# Patient Record
Sex: MALE | Race: BLACK OR AFRICAN AMERICAN | NOT HISPANIC OR LATINO | URBAN - METROPOLITAN AREA
[De-identification: names, ages, dates, MRNs, and addresses within clinical notes are randomized per-mention and may not be internally consistent; named-entity substitution may affect disease eponyms.]

---

## 2021-10-08 ENCOUNTER — EMERGENCY (EMERGENCY)
Facility: HOSPITAL | Age: 31
LOS: 1 days | Discharge: HOME | End: 2021-10-08
Attending: EMERGENCY MEDICINE | Admitting: EMERGENCY MEDICINE
Payer: COMMERCIAL

## 2021-10-08 VITALS
DIASTOLIC BLOOD PRESSURE: 72 MMHG | OXYGEN SATURATION: 100 % | SYSTOLIC BLOOD PRESSURE: 129 MMHG | HEART RATE: 96 BPM | RESPIRATION RATE: 18 BRPM | TEMPERATURE: 97 F

## 2021-10-08 VITALS
SYSTOLIC BLOOD PRESSURE: 112 MMHG | DIASTOLIC BLOOD PRESSURE: 65 MMHG | TEMPERATURE: 98 F | HEART RATE: 93 BPM | OXYGEN SATURATION: 100 % | RESPIRATION RATE: 18 BRPM

## 2021-10-08 DIAGNOSIS — R53.83 OTHER FATIGUE: ICD-10-CM

## 2021-10-08 DIAGNOSIS — J45.909 UNSPECIFIED ASTHMA, UNCOMPLICATED: ICD-10-CM

## 2021-10-08 DIAGNOSIS — R51.9 HEADACHE, UNSPECIFIED: ICD-10-CM

## 2021-10-08 DIAGNOSIS — J02.9 ACUTE PHARYNGITIS, UNSPECIFIED: ICD-10-CM

## 2021-10-08 DIAGNOSIS — M79.10 MYALGIA, UNSPECIFIED SITE: ICD-10-CM

## 2021-10-08 DIAGNOSIS — Z88.6 ALLERGY STATUS TO ANALGESIC AGENT: ICD-10-CM

## 2021-10-08 LAB
RAPID RVP RESULT: SIGNIFICANT CHANGE UP
SARS-COV-2 RNA SPEC QL NAA+PROBE: SIGNIFICANT CHANGE UP

## 2021-10-08 PROCEDURE — 99283 EMERGENCY DEPT VISIT LOW MDM: CPT

## 2021-10-08 PROCEDURE — 0225U NFCT DS DNA&RNA 21 SARSCOV2: CPT

## 2021-10-08 PROCEDURE — 99284 EMERGENCY DEPT VISIT MOD MDM: CPT

## 2021-10-08 RX ORDER — ACETAMINOPHEN 500 MG
650 TABLET ORAL ONCE
Refills: 0 | Status: COMPLETED | OUTPATIENT
Start: 2021-10-08 | End: 2021-10-08

## 2021-10-08 RX ADMIN — Medication 650 MILLIGRAM(S): at 16:34

## 2021-10-08 NOTE — ED PROVIDER NOTE - ATTENDING CONTRIBUTION TO CARE
Pt w/ PMHx asthma, not vaccinated against COVID19 p/w HA, sore throat, myalgias, and fatigue x 10 days. Pt went to an UC, had + rapid strep, placed on abx, w/ improved sore throat, but still w/ dull diffuse HA's, feeling tired, temporarily improved w/ Tylenol. No f/c. No CP, SOB.  No neck stiffness, n/v. Pt w/ PMHx asthma, not vaccinated against COVID19 p/w HA, sore throat, myalgias, and fatigue x 10 days. Pt went to an UC, had + rapid strep, placed on abx, w/ improved sore throat, but still w/ dull diffuse HA's, feeling tired, temporarily improved w/ Tylenol. No f/c. No CP, SOB.  No neck stiffness, n/v. No neuro complaints.   VSS, non toxic appearing, in NAD  RVP performed, f/u results. Supportive care, f/u PCP

## 2021-10-08 NOTE — ED PROVIDER NOTE - PHYSICAL EXAMINATION
Vitals reviewed  Gen: well appearing, nad, speaking in full sentences  Skin: wwp, no rash/lesions  HEENT: ncat, eomi, mmm, no tonsillar edema/exudate, uvula midline,   Neck: supple, no LAD, no meningeal signs  CV: rrr, no audible m/r/g  Resp: symmetrical expansion, ctab, no w/r/r  Abd: nondistended, soft/nt  Ext: FROM throughout, no peripheral edema  Neuro: alert/oriented, no focal deficits, steady gait

## 2021-10-08 NOTE — ED PROVIDER NOTE - NSFOLLOWUPINSTRUCTIONS_ED_ALL_ED_FT
It is likely that you have covid19 or another viral syndrome, which means you need to isolate yourself at home for 10 days after onset of symptoms if covid is positive.  Your results will not come back for approximately 12 hours.    Complete full course of antibiotics for strep throat as prescribed.      PLEASE REST AND REMAIN HYDRATED (EG, GATORADE, PEDIALYTE, ETC). MOTRIN or TYLENOL AS NEEDED FOR FEVER (>100.4F/>38C)  PLEASE FOLLOW-UP WITH YOUR PCP IN 1-2 DAYS    PLEASE RETURN TO THE EMERGENCY DEPARTMENT IF SOMNOLENCE, CHEST PAIN, SHORTNESS OF BREATH, ABDOMINAL PAIN, VOMITING, OTHER CONCERNING SYMPTOMS

## 2021-10-08 NOTE — ED PROVIDER NOTE - PATIENT PORTAL LINK FT
You can access the FollowMyHealth Patient Portal offered by [facility] by registering at the following website: [email or web address]. By joining [employer]’s FollowMyHealth portal, you will also be able to view your health information using other applications (apps) compatible with our system.

## 2021-10-08 NOTE — ED PROVIDER NOTE - CLINICAL SUMMARY MEDICAL DECISION MAKING FREE TEXT BOX
30 M pmh asthma, NOT vaccinated for covid p/w headache, sore throat, bodyaches and fatigue x 10 days. pt currently taking abx for strep.  pt well appearing, afebrile, neck supple, no meningeal signs, posterior OP clear, lungs ctab, hrrr, no hypoxia/dyspnea.  RVP/covid sent.  instructed to continue abx and take tylneol/motrin prn, PO hydration and f/u with pmd.  discussed strict return parameters

## 2021-10-08 NOTE — ED PROVIDER NOTE - OBJECTIVE STATEMENT
30 M pmh asthma, NOT vaccinated for covid p/w headache, sore throat, bodyaches and fatigue x 10 days. 30 M pmh asthma, NOT vaccinated for covid p/w headache, sore throat, bodyaches and fatigue x 10 days.  pt reports dull diffuse headache w/ pressure behind b/l eyes, improves after taking tylenol and comes back when meds wear off- no neck stiffness.  went to  one week ago and tested positive for strep, currently on abx (cannot recall which), sore throat improving.  Unsure if he was tested for covid.  Denies f/c, dizziness, numbness/weakness, chest pain, sob, cough, abd pain, nv, fall/trauma

## 2021-10-08 NOTE — ED ADULT TRIAGE NOTE - OTHER COMPLAINTS
pt here for headache with numbness to the forehead and chills x1 week, also sore and thigh throat since today, reports hx of asthma, denies any focal weakness or changes, ambulatory with steady gait, no neuro deficits, speaking in full sentences, normal WOB

## 2021-10-14 ENCOUNTER — EMERGENCY (EMERGENCY)
Facility: HOSPITAL | Age: 31
LOS: 1 days | Discharge: HOME | End: 2021-10-14
Attending: EMERGENCY MEDICINE | Admitting: EMERGENCY MEDICINE
Payer: COMMERCIAL

## 2021-10-14 VITALS
RESPIRATION RATE: 20 BRPM | OXYGEN SATURATION: 98 % | SYSTOLIC BLOOD PRESSURE: 141 MMHG | HEIGHT: 70 IN | WEIGHT: 212.08 LBS | TEMPERATURE: 98 F | HEART RATE: 104 BPM | DIASTOLIC BLOOD PRESSURE: 70 MMHG

## 2021-10-14 VITALS — HEART RATE: 99 BPM

## 2021-10-14 DIAGNOSIS — Z20.822 CONTACT WITH AND (SUSPECTED) EXPOSURE TO COVID-19: ICD-10-CM

## 2021-10-14 DIAGNOSIS — R06.02 SHORTNESS OF BREATH: ICD-10-CM

## 2021-10-14 DIAGNOSIS — R07.89 OTHER CHEST PAIN: ICD-10-CM

## 2021-10-14 DIAGNOSIS — R00.2 PALPITATIONS: ICD-10-CM

## 2021-10-14 PROBLEM — J45.909 UNSPECIFIED ASTHMA, UNCOMPLICATED: Chronic | Status: ACTIVE | Noted: 2021-10-08

## 2021-10-14 LAB
ALBUMIN SERPL ELPH-MCNC: 3.6 G/DL — SIGNIFICANT CHANGE UP (ref 3.3–5)
ALP SERPL-CCNC: 61 U/L — SIGNIFICANT CHANGE UP (ref 40–120)
ALT FLD-CCNC: 86 U/L — HIGH (ref 10–45)
ANION GAP SERPL CALC-SCNC: 8 MMOL/L — SIGNIFICANT CHANGE UP (ref 5–17)
AST SERPL-CCNC: 28 U/L — SIGNIFICANT CHANGE UP (ref 10–40)
BASOPHILS # BLD AUTO: 0.01 K/UL — SIGNIFICANT CHANGE UP (ref 0–0.2)
BASOPHILS NFR BLD AUTO: 0.2 % — SIGNIFICANT CHANGE UP (ref 0–2)
BILIRUB SERPL-MCNC: 0.2 MG/DL — SIGNIFICANT CHANGE UP (ref 0.2–1.2)
BUN SERPL-MCNC: 15 MG/DL — SIGNIFICANT CHANGE UP (ref 7–23)
CALCIUM SERPL-MCNC: 9.5 MG/DL — SIGNIFICANT CHANGE UP (ref 8.4–10.5)
CHLORIDE SERPL-SCNC: 109 MMOL/L — HIGH (ref 96–108)
CO2 SERPL-SCNC: 26 MMOL/L — SIGNIFICANT CHANGE UP (ref 22–31)
CREAT SERPL-MCNC: 0.75 MG/DL — SIGNIFICANT CHANGE UP (ref 0.5–1.3)
D DIMER BLD IA.RAPID-MCNC: <150 NG/ML DDU — SIGNIFICANT CHANGE UP
EOSINOPHIL # BLD AUTO: 0.13 K/UL — SIGNIFICANT CHANGE UP (ref 0–0.5)
EOSINOPHIL NFR BLD AUTO: 2.3 % — SIGNIFICANT CHANGE UP (ref 0–6)
GLUCOSE SERPL-MCNC: 142 MG/DL — HIGH (ref 70–99)
HCT VFR BLD CALC: 36.7 % — LOW (ref 39–50)
HGB BLD-MCNC: 11.8 G/DL — LOW (ref 13–17)
IMM GRANULOCYTES NFR BLD AUTO: 0.2 % — SIGNIFICANT CHANGE UP (ref 0–1.5)
LYMPHOCYTES # BLD AUTO: 1.01 K/UL — SIGNIFICANT CHANGE UP (ref 1–3.3)
LYMPHOCYTES # BLD AUTO: 18.2 % — SIGNIFICANT CHANGE UP (ref 13–44)
MCHC RBC-ENTMCNC: 25.9 PG — LOW (ref 27–34)
MCHC RBC-ENTMCNC: 32.2 GM/DL — SIGNIFICANT CHANGE UP (ref 32–36)
MCV RBC AUTO: 80.5 FL — SIGNIFICANT CHANGE UP (ref 80–100)
MONOCYTES # BLD AUTO: 0.67 K/UL — SIGNIFICANT CHANGE UP (ref 0–0.9)
MONOCYTES NFR BLD AUTO: 12.1 % — SIGNIFICANT CHANGE UP (ref 2–14)
NEUTROPHILS # BLD AUTO: 3.73 K/UL — SIGNIFICANT CHANGE UP (ref 1.8–7.4)
NEUTROPHILS NFR BLD AUTO: 67 % — SIGNIFICANT CHANGE UP (ref 43–77)
NRBC # BLD: 0 /100 WBCS — SIGNIFICANT CHANGE UP (ref 0–0)
PLATELET # BLD AUTO: 184 K/UL — SIGNIFICANT CHANGE UP (ref 150–400)
POTASSIUM SERPL-MCNC: 3.9 MMOL/L — SIGNIFICANT CHANGE UP (ref 3.5–5.3)
POTASSIUM SERPL-SCNC: 3.9 MMOL/L — SIGNIFICANT CHANGE UP (ref 3.5–5.3)
PROT SERPL-MCNC: 6.4 G/DL — SIGNIFICANT CHANGE UP (ref 6–8.3)
RBC # BLD: 4.56 M/UL — SIGNIFICANT CHANGE UP (ref 4.2–5.8)
RBC # FLD: 11.7 % — SIGNIFICANT CHANGE UP (ref 10.3–14.5)
SARS-COV-2 RNA SPEC QL NAA+PROBE: SIGNIFICANT CHANGE UP
SODIUM SERPL-SCNC: 143 MMOL/L — SIGNIFICANT CHANGE UP (ref 135–145)
TROPONIN T SERPL-MCNC: 0.01 NG/ML — SIGNIFICANT CHANGE UP (ref 0–0.01)
WBC # BLD: 5.56 K/UL — SIGNIFICANT CHANGE UP (ref 3.8–10.5)
WBC # FLD AUTO: 5.56 K/UL — SIGNIFICANT CHANGE UP (ref 3.8–10.5)

## 2021-10-14 PROCEDURE — 93005 ELECTROCARDIOGRAM TRACING: CPT

## 2021-10-14 PROCEDURE — 85379 FIBRIN DEGRADATION QUANT: CPT

## 2021-10-14 PROCEDURE — 36415 COLL VENOUS BLD VENIPUNCTURE: CPT

## 2021-10-14 PROCEDURE — 99283 EMERGENCY DEPT VISIT LOW MDM: CPT | Mod: 25

## 2021-10-14 PROCEDURE — 84484 ASSAY OF TROPONIN QUANT: CPT

## 2021-10-14 PROCEDURE — 93010 ELECTROCARDIOGRAM REPORT: CPT

## 2021-10-14 PROCEDURE — 85025 COMPLETE CBC W/AUTO DIFF WBC: CPT

## 2021-10-14 PROCEDURE — 71045 X-RAY EXAM CHEST 1 VIEW: CPT

## 2021-10-14 PROCEDURE — 71045 X-RAY EXAM CHEST 1 VIEW: CPT | Mod: 26

## 2021-10-14 PROCEDURE — 99285 EMERGENCY DEPT VISIT HI MDM: CPT | Mod: 25

## 2021-10-14 PROCEDURE — U0003: CPT

## 2021-10-14 PROCEDURE — 80053 COMPREHEN METABOLIC PANEL: CPT

## 2021-10-14 PROCEDURE — U0005: CPT

## 2021-10-14 RX ORDER — SODIUM CHLORIDE 9 MG/ML
1000 INJECTION INTRAMUSCULAR; INTRAVENOUS; SUBCUTANEOUS ONCE
Refills: 0 | Status: COMPLETED | OUTPATIENT
Start: 2021-10-14 | End: 2021-10-14

## 2021-10-14 RX ADMIN — SODIUM CHLORIDE 1000 MILLILITER(S): 9 INJECTION INTRAMUSCULAR; INTRAVENOUS; SUBCUTANEOUS at 08:35

## 2021-10-14 NOTE — ED PROVIDER NOTE - OBJECTIVE STATEMENT
30 M nonsmoker, no FH CAD, pmh asthma p/w chest pain, palpitations and sob x 3 weeks, worse this morning.  pt reports intermittent L sternal chest pressure occasionally radiates to R side w/ assocaited palpitations and difficulty taking deep breath.  Had episode that lasted approx 30 minutes this morning while driving to work which has since improved- worse than prior episodes.  Also w/ dull headache.  Pt reports URI sxs w/ sore throat 3 weeks ago which have been improving.  tested neg RVP/covid at ED visit approx one week ago.  Denies f/c, dizziness, fainting, abd pain, nvd, urinary sxs, leg pain/swelling, h/o dvt/pe, recent travel/immobilization, trauma.  pt not vaccinated for covid

## 2021-10-14 NOTE — ED PROVIDER NOTE - PHYSICAL EXAMINATION
Vitals reviewed  Gen: well appearing, nad, speaking in full sentences- no hypoxia/dyspnea   Skin: wwp, no rash/lesions  HEENT: ncat, eomi, mmm  CV: tachycardia, regular rhythm, no audible m/r/g  Resp: symmetrical expansion, ctab, no w/r/r  Abd: nondistended, soft/nt  Ext: FROM throughout, no peripheral edema/calf ttp   Neuro: alert/oriented, no focal deficits, steady gait

## 2021-10-14 NOTE — ED PROVIDER NOTE - CLINICAL SUMMARY MEDICAL DECISION MAKING FREE TEXT BOX
30 M nonsmoker, no FH CAD, pmh asthma p/w chest pain, palpitations and sob x 3 weeks, worse this morning, since resolved.  low risk heart score, tachy and anxious appearing, lungs ctab, hrrr, no LE edema/calf ttp.  will obtain labs w/ ddimer, EKG, CXR and given IVF

## 2021-10-14 NOTE — ED PROVIDER NOTE - PROGRESS NOTE DETAILS
labs wnl, neg trop/ddimer.  CXR wnl.  HR improved and pt feeling well, has outpt pmd f/u.  discussed strict return parameters

## 2021-10-14 NOTE — ED PROVIDER NOTE - PATIENT PORTAL LINK FT
You can access the FollowMyHealth Patient Portal offered by [facility] by registering at the following website: [email or web address]. By joining [employer]’s FollowMyHealth portal, you will also be able to view your health information using other applications (apps) compatible with our system.

## 2021-10-14 NOTE — ED PROVIDER NOTE - ATTENDING CONTRIBUTION TO CARE
30 M nonsmoker, no FH of early CAD, PMH of asthma p/w chest pain, palpitations and sob x 3 weeks, worse this morning. Reports intermittent L sternal chest pressure occasionally radiates to R side w/ associated palpitations and difficulty taking deep breaths.  Had episode that lasted approx 30 minutes this morning while driving to work which has since improved. Reports URI sxs w/ sore throat 3 weeks ago which have been improving.  tested neg RVP/covid at ED visit approx one week ago.  Denies f/c, dizziness, fainting, abd pain, nvd, urinary sxs, leg pain/swelling, h/o dvt/pe, recent travel/immobilization, trauma. Pt not vaccinated for covid 19. Pt tachy and anxious appearing, lungs ctab, hrrr, no LE edema/calf ttp. ECG noted. Labs noted and wnl, neg trop/ddimer.  CXR wnl.  HR improved and pt feeling well, has outpt pmd f/u. Return parameters discussed.

## 2021-10-14 NOTE — ED ADULT NURSE NOTE - OBJECTIVE STATEMENT
Patient presents to ED c/o intermittent left and right sharp chest pain today with weakness, sore throat, cough w/ yellow green thick phlegm, and intermittent neck pain and headache for 3 weeks. Patient states that he tested positive for strep throat 2 weeks ago. Patient denies SOB, palpitations, nausea, vomiting, diarrhea, or fever. PMH Asthma (no prior intubations). Allergy to ibuprofen confirmed. Patient has no family hx or sudden family death of cardiac dx. Patient is unvaccinated against COVID. Patient presents well groomed and well appearing, in no acute distress resting in bed at this time connected to cardiac monitor.

## 2021-10-18 ENCOUNTER — EMERGENCY (EMERGENCY)
Facility: HOSPITAL | Age: 31
LOS: 1 days | Discharge: HOME | End: 2021-10-18
Admitting: EMERGENCY MEDICINE
Payer: COMMERCIAL

## 2021-10-18 VITALS
HEIGHT: 70 IN | HEART RATE: 84 BPM | TEMPERATURE: 98 F | DIASTOLIC BLOOD PRESSURE: 74 MMHG | SYSTOLIC BLOOD PRESSURE: 134 MMHG | WEIGHT: 214.95 LBS | OXYGEN SATURATION: 100 % | RESPIRATION RATE: 18 BRPM

## 2021-10-18 VITALS
RESPIRATION RATE: 19 BRPM | OXYGEN SATURATION: 99 % | DIASTOLIC BLOOD PRESSURE: 81 MMHG | SYSTOLIC BLOOD PRESSURE: 122 MMHG | HEART RATE: 79 BPM | TEMPERATURE: 98 F

## 2021-10-18 DIAGNOSIS — M54.2 CERVICALGIA: ICD-10-CM

## 2021-10-18 DIAGNOSIS — R53.1 WEAKNESS: ICD-10-CM

## 2021-10-18 DIAGNOSIS — R42 DIZZINESS AND GIDDINESS: ICD-10-CM

## 2021-10-18 DIAGNOSIS — Z88.6 ALLERGY STATUS TO ANALGESIC AGENT: ICD-10-CM

## 2021-10-18 DIAGNOSIS — J45.909 UNSPECIFIED ASTHMA, UNCOMPLICATED: ICD-10-CM

## 2021-10-18 PROCEDURE — 99282 EMERGENCY DEPT VISIT SF MDM: CPT

## 2021-10-18 PROCEDURE — 99284 EMERGENCY DEPT VISIT MOD MDM: CPT

## 2021-10-18 NOTE — ED PROVIDER NOTE - PATIENT PORTAL LINK FT
You can access the FollowMyHealth Patient Portal offered by [facility] by registering at the following website: [email or web address]. By joining [employer]’s FollowMyHealth portal, you will also be able to view your health information using other applications (apps) compatible with our system.

## 2021-10-18 NOTE — ED ADULT TRIAGE NOTE - CHIEF COMPLAINT QUOTE
weakness ,neck pain for weeks , trouble sleeping tonight. Pt was seen and d/venita  4 days ago for the same problem.

## 2021-10-18 NOTE — ED PROVIDER NOTE - NSFOLLOWUPINSTRUCTIONS_ED_ALL_ED_FT
REST, DRINK LOTS OF FLUIDS -- WATER, LIMIT CAFFEINE INTAKE, TYLENOL AS NEEDED, FOLLOW UP WITH YOUR PMD  Dizziness  Dizziness can manifest as a feeling of unsteadiness or light-headedness. You may feel like you are about to faint. This condition can be caused by a number of things, including medicines, dehydration, or illness. Drink enough fluid to keep your urine clear or pale yellow. Do not drink alcohol and limit your caffeine intake. Avoid quick or sudden movements.  Rise slowly from chairs and steady yourself until you feel okay. In the morning, first sit up on the side of the bed.  SEEK IMMEDIATE MEDICAL CARE IF YOU HAVE ANY OF THE FOLLOWING SYMPTOMS: vomiting, changes in your vision or speech, weakness in your arms or legs, trouble speaking or swallowing, chest pain, abdominal pain, shortness of breath, sweating, bleeding, headache, neck pain, or fever.  WHAT YOU NEED TO KNOW:  Weakness is a loss of muscle strength. It may be caused by brain, nerve, or muscle problems. Physical and mental conditions such as heart problems, pregnancy, dehydration, or depression may also cause weakness. Reactions to certain drugs can cause weakness. Parts of your body may become weak if you need to wear a cast or splint or have been on bed rest for a long time.  DISCHARGE INSTRUCTIONS:  Call 911 for any of the following:   •You have any of the following signs of a stroke: ?Numbness or drooping on one side of your face   ?Weakness in an arm or leg  ?Confusion or difficulty speaking  ?Dizziness, a severe headache, or vision loss  •You lose feeling in your weakened body area.  •You have electric shock-like feelings down your arms and legs when you flex or move your neck.  •You have sudden or increased trouble speaking, swallowing, or breathing.  Return to the emergency department if:   •You have severe pain in your back, arms, or legs that worsens.  •You have sudden or worsened muscle weakness or loss of movement.  •You are not able to control when you urinate or have a bowel movement  Contact your healthcare provider if:   •You feel depressed or anxious.   •You have questions or concerns about your condition or care.   Manage weakness:   •Use assistive devices as directed. These help protect you from injury. Examples include a walker or cane. Have someone install handrails in your home. These will help you get out of a bathtub or stand up from a toilet. Use a shower chair so you can sit while you shower. Sit down on the toilet or another chair to dry off and put on your clothes. Get help going up and down stairs if your legs are weak.   •Go to physical or occupational therapy if directed. A physical therapist can teach you exercises to help strengthen weak muscles. An occupational therapist can show you ways to do your daily activities more easily. For example, light forks and spoons can be easier to use if you have hand weakness. You may also learn ways to organize your household items so you are not moving heavy items.  •Balance rest with exercise. Exercise can help increase your muscle strength and energy. Do not exercise for long periods at a time. Take breaks often to rest. Too much exercise can cause muscle strain or make you more tired. Ask your healthcare provider how much exercise is right for you.  •Eat a variety of healthy foods. Too much or too little food may cause weakness or tiredness. Ask your healthcare provider what a healthy amount of food is for you. Healthy foods include fruits, vegetables, whole-grain breads, low-fat dairy products, lean meats and fish, nuts, and cooked beans.  •Do not smoke. Nicotine and other chemicals in cigarettes and cigars can make your symptoms worse, and can cause lung damage. Ask your healthcare provider for information if you currently smoke and need help to quit. E-cigarettes or smokeless tobacco still contain nicotine. Talk to your healthcare provider before you use these products.   •Do not use caffeine, alcohol, or illegal drugs. These may cause muscle twitching, which could lead to worsened weakness.

## 2021-10-18 NOTE — ED PROVIDER NOTE - CLINICAL SUMMARY MEDICAL DECISION MAKING FREE TEXT BOX
pt c/o feeling dizzy while trying to sleep - not vertigo by hx, states that has been feeling weak x 3 wks, seen few d ago for cp/neck pain and had extensive w/u - all neg, non focal neuro exam, asymptomatic at this time, hemodynamically stable, well appearing, no indication to repeat labs at this time, ? anxiety ? dehydration ? viral; pt reassured and stable for dc, pt understands and agrees w/plan, strict return precautions given

## 2021-10-18 NOTE — ED PROVIDER NOTE - OBJECTIVE STATEMENT
The pt is a 29 y/o M, who presents to ED c/o feeling "dizzy" while trying to sleep.  Pt states that has been feeling weak and tired x 3 wks, had strep throat - was treated, then had some chest and neck pain 4 d ago - seen in ED and had labs, cxr, ekg (all wnl), tonight was trying to sleep and "felt dizzy" - defines as woozy while laying in bed. Symptoms have fully resolved, currently c/o free. Denies h/a, visual changes, n/v/d, cp, sob, syncope, fevers, chills

## 2022-11-25 NOTE — ED PROVIDER NOTE - NS ED ROS FT
Unable to contact. Letter sent. Encounter closed.      ROS as per HPI, rest 10pt ROS reviewed and negative

## 2024-09-23 NOTE — ED PROVIDER NOTE - NSFOLLOWUPINSTRUCTIONS_ED_ALL_ED_FT
Last visit:   6/5/2024   Next visit:   12/5/2024     Last Labs   GOT/AST (Units/L)   Date Value   06/06/2024 13     GPT/ALT (Units/L)   Date Value   06/06/2024 20     Creatinine (mg/dL)   Date Value   06/06/2024 0.84      WBC (K/mcL)   Date Value   06/06/2024 6.6     Last Refill:      guanFACINE (INTUNIV) 1 MG TABLET SR 24 HR 30 tablet 3 6/6/2024 --    Sig - Route: Take 1 tablet by mouth daily. - Oral    Sent to pharmacy as: guanFACINE HCl ER 1 MG Oral Tablet Extended Release 24 Hour (INTUNIV)    Class: Eprescribe    E-Prescribing Status: Receipt confirmed by pharmacy (6/6/2024  1:44 PM CDT)                 Please follow up with primary care doctor as scheduled.      Chest Pain    Chest pain can be caused by many different conditions which may or may not be dangerous. Causes include heartburn, lung infections, heart attack, blood clot in lungs, skin infections, strain or damage to muscle, cartilage, or bones, etc. In addition to a history and physical examination, an electrocardiogram (ECG) or other lab tests may have been performed to determine the cause of your chest pain. Follow up with your primary care provider or with a cardiologist as instructed.     SEEK IMMEDIATE MEDICAL CARE IF YOU HAVE ANY OF THE FOLLOWING SYMPTOMS: worsening chest pain, coughing up blood, unexplained back/neck/jaw pain, severe abdominal pain, dizziness or lightheadedness, fainting, shortness of breath, sweaty or clammy skin, vomiting, or racing heart beat. These symptoms may represent a serious problem that is an emergency. Do not wait to see if the symptoms will go away. Get medical help right away. Call 911 and do not drive yourself to the hospital.

## 2025-02-23 NOTE — ED ADULT NURSE NOTE - NS ED NOTE ABUSE RESPONSE YN
Received intake call for home oxygen at 10:39AM. Reviewed patient's chart; We are not in contract with Medicaid insurance. Spoke with nurse Marry and informed her patient will have to go through [facility] for oxygen.    10:50AM Spoke with [facility] call center and paged a request to have someone return my call.    11AM Received call from [facility], they will work on getting oxygen delivered to bedside. Faxed them all of patient's documentation per their request.    11:16AM informed Marry of update and provided her their phone number to call and check on O2 status.   Yes